# Patient Record
Sex: MALE | URBAN - METROPOLITAN AREA
[De-identification: names, ages, dates, MRNs, and addresses within clinical notes are randomized per-mention and may not be internally consistent; named-entity substitution may affect disease eponyms.]

---

## 2023-01-31 ENCOUNTER — OFFICE (OUTPATIENT)
Dept: URBAN - METROPOLITAN AREA CLINIC 63 | Facility: CLINIC | Age: 88
Setting detail: OPHTHALMOLOGY
End: 2023-01-31
Payer: MEDICARE

## 2023-01-31 DIAGNOSIS — H35.033: ICD-10-CM

## 2023-01-31 DIAGNOSIS — H40.1133: ICD-10-CM

## 2023-01-31 DIAGNOSIS — H25.13: ICD-10-CM

## 2023-01-31 PROCEDURE — 92250 FUNDUS PHOTOGRAPHY W/I&R: CPT | Performed by: OPHTHALMOLOGY

## 2023-01-31 PROCEDURE — 92014 COMPRE OPH EXAM EST PT 1/>: CPT | Performed by: OPHTHALMOLOGY

## 2023-01-31 ASSESSMENT — PACHYMETRY
OD_CT_CORRECTION: -4
OD_CT_UM: 602
OS_CT_CORRECTION: -3
OS_CT_UM: 586

## 2023-01-31 ASSESSMENT — TONOMETRY
OD_IOP_MMHG: 14
OS_IOP_MMHG: 14

## 2023-01-31 ASSESSMENT — CONFRONTATIONAL VISUAL FIELD TEST (CVF)
OS_FINDINGS: FULL
OD_FINDINGS: FULL

## 2023-01-31 ASSESSMENT — REFRACTION_AUTOREFRACTION
OS_SPHERE: UTP
OD_SPHERE: UTP

## 2023-05-30 ENCOUNTER — OFFICE (OUTPATIENT)
Dept: URBAN - METROPOLITAN AREA CLINIC 63 | Facility: CLINIC | Age: 88
Setting detail: OPHTHALMOLOGY
End: 2023-05-30
Payer: MEDICARE

## 2023-05-30 DIAGNOSIS — H40.1133: ICD-10-CM

## 2023-05-30 DIAGNOSIS — H35.033: ICD-10-CM

## 2023-05-30 DIAGNOSIS — H25.13: ICD-10-CM

## 2023-05-30 PROCEDURE — 92133 CPTRZD OPH DX IMG PST SGM ON: CPT | Performed by: OPHTHALMOLOGY

## 2023-05-30 PROCEDURE — 92020 GONIOSCOPY: CPT | Performed by: OPHTHALMOLOGY

## 2023-05-30 PROCEDURE — 92012 INTRM OPH EXAM EST PATIENT: CPT | Performed by: OPHTHALMOLOGY

## 2023-05-30 ASSESSMENT — TONOMETRY
OD_IOP_MMHG: 12
OS_IOP_MMHG: 12

## 2023-05-30 ASSESSMENT — VISUAL ACUITY
OS_BCVA: 20/20-1
OD_BCVA: HM

## 2023-05-30 ASSESSMENT — PACHYMETRY
OS_CT_CORRECTION: -3
OD_CT_UM: 602
OS_CT_UM: 586
OD_CT_CORRECTION: -4

## 2023-05-30 ASSESSMENT — SPHEQUIV_DERIVED
OS_SPHEQUIV: -8.75
OD_SPHEQUIV: -21.5

## 2023-05-30 ASSESSMENT — KERATOMETRY
OS_AXISANGLE_DEGREES: 093
OS_K1POWER_DIOPTERS: 40.00
METHOD_AUTO_MANUAL: AUTO
OS_K2POWER_DIOPTERS: 41.50
OD_K1POWER_DIOPTERS: 41.50
OD_K2POWER_DIOPTERS: 43.25
OD_AXISANGLE_DEGREES: 003

## 2023-05-30 ASSESSMENT — REFRACTION_AUTOREFRACTION
OS_SPHERE: -7.00
OD_SPHERE: -21.00
OD_AXIS: 130
OS_AXIS: 059
OS_CYLINDER: -3.50
OD_CYLINDER: -1.00

## 2023-05-30 ASSESSMENT — CONFRONTATIONAL VISUAL FIELD TEST (CVF)
OS_FINDINGS: FULL
OD_FINDINGS: FULL

## 2023-05-30 ASSESSMENT — AXIALLENGTH_DERIVED
OS_AL: 29.03
OD_AL: 37.6

## 2023-10-24 ENCOUNTER — OFFICE (OUTPATIENT)
Dept: URBAN - METROPOLITAN AREA CLINIC 63 | Facility: CLINIC | Age: 88
Setting detail: OPHTHALMOLOGY
End: 2023-10-24
Payer: MEDICARE

## 2023-10-24 DIAGNOSIS — H25.13: ICD-10-CM

## 2023-10-24 DIAGNOSIS — H40.1133: ICD-10-CM

## 2023-10-24 DIAGNOSIS — H35.033: ICD-10-CM

## 2023-10-24 PROCEDURE — 92014 COMPRE OPH EXAM EST PT 1/>: CPT | Performed by: OPHTHALMOLOGY

## 2023-10-24 PROCEDURE — 92083 EXTENDED VISUAL FIELD XM: CPT | Performed by: OPHTHALMOLOGY

## 2023-10-24 ASSESSMENT — REFRACTION_AUTOREFRACTION
OS_SPHERE: -7.00
OS_CYLINDER: -3.50
OD_CYLINDER: -1.00
OD_AXIS: 130
OS_AXIS: 059
OD_SPHERE: -21.00

## 2023-10-24 ASSESSMENT — KERATOMETRY
OD_K2POWER_DIOPTERS: 43.25
OS_K2POWER_DIOPTERS: 41.50
METHOD_AUTO_MANUAL: AUTO
OD_AXISANGLE_DEGREES: 003
OS_K1POWER_DIOPTERS: 40.00
OS_AXISANGLE_DEGREES: 093
OD_K1POWER_DIOPTERS: 41.50

## 2023-10-24 ASSESSMENT — PACHYMETRY
OD_CT_CORRECTION: -4
OS_CT_UM: 586
OS_CT_CORRECTION: -3
OD_CT_UM: 602

## 2023-10-24 ASSESSMENT — AXIALLENGTH_DERIVED
OD_AL: 37.6
OS_AL: 29.03

## 2023-10-24 ASSESSMENT — SPHEQUIV_DERIVED
OD_SPHEQUIV: -21.5
OS_SPHEQUIV: -8.75

## 2023-10-24 ASSESSMENT — VISUAL ACUITY
OD_BCVA: HM
OS_BCVA: 20/25

## 2023-10-24 ASSESSMENT — CONFRONTATIONAL VISUAL FIELD TEST (CVF)
OS_FINDINGS: FULL
OD_FINDINGS: FULL

## 2023-10-24 ASSESSMENT — TONOMETRY
OD_IOP_MMHG: 13
OS_IOP_MMHG: 13

## 2024-02-27 ENCOUNTER — OFFICE (OUTPATIENT)
Dept: URBAN - METROPOLITAN AREA CLINIC 63 | Facility: CLINIC | Age: 89
Setting detail: OPHTHALMOLOGY
End: 2024-02-27
Payer: MEDICARE

## 2024-02-27 DIAGNOSIS — H40.1133: ICD-10-CM

## 2024-02-27 DIAGNOSIS — H25.13: ICD-10-CM

## 2024-02-27 DIAGNOSIS — H35.033: ICD-10-CM

## 2024-02-27 PROCEDURE — 92250 FUNDUS PHOTOGRAPHY W/I&R: CPT | Performed by: OPHTHALMOLOGY

## 2024-02-27 PROCEDURE — 92014 COMPRE OPH EXAM EST PT 1/>: CPT | Performed by: OPHTHALMOLOGY

## 2024-02-27 ASSESSMENT — REFRACTION_AUTOREFRACTION
OD_AXIS: 130
OS_CYLINDER: -3.50
OD_CYLINDER: -1.00
OD_SPHERE: -21.00
OS_AXIS: 059
OS_SPHERE: -7.00

## 2024-02-27 ASSESSMENT — CONFRONTATIONAL VISUAL FIELD TEST (CVF)
OD_FINDINGS: FULL
OS_FINDINGS: FULL

## 2024-02-27 ASSESSMENT — SPHEQUIV_DERIVED
OS_SPHEQUIV: -8.75
OD_SPHEQUIV: -21.5

## 2024-03-26 ENCOUNTER — OFFICE (OUTPATIENT)
Dept: URBAN - METROPOLITAN AREA CLINIC 63 | Facility: CLINIC | Age: 89
Setting detail: OPHTHALMOLOGY
End: 2024-03-26
Payer: MEDICARE

## 2024-03-26 DIAGNOSIS — H35.033: ICD-10-CM

## 2024-03-26 DIAGNOSIS — H25.13: ICD-10-CM

## 2024-03-26 DIAGNOSIS — H25.11: ICD-10-CM

## 2024-03-26 DIAGNOSIS — H40.1133: ICD-10-CM

## 2024-03-26 PROCEDURE — 99214 OFFICE O/P EST MOD 30 MIN: CPT | Performed by: OPHTHALMOLOGY

## 2024-03-26 PROCEDURE — 92136 OPHTHALMIC BIOMETRY: CPT | Mod: RT | Performed by: OPHTHALMOLOGY

## 2024-03-26 PROCEDURE — 92136 OPHTHALMIC BIOMETRY: CPT | Mod: TC | Performed by: OPHTHALMOLOGY

## 2024-03-26 ASSESSMENT — REFRACTION_CURRENTRX
OD_CYLINDER: -0.75
OS_VPRISM_DIRECTION: SV
OS_OVR_VA: 20/
OS_SPHERE: +3.00
OD_OVR_VA: 20/
OD_VPRISM_DIRECTION: SV
OD_SPHERE: +3.25
OS_CYLINDER: -1.00
OS_AXIS: 102
OD_AXIS: 108

## 2024-03-26 ASSESSMENT — REFRACTION_MANIFEST
OD_SPHERE: +3.25
OD_VA1: 20/25
OD_AXIS: 108
OD_CYLINDER: -0.75

## 2024-03-26 ASSESSMENT — SPHEQUIV_DERIVED: OD_SPHEQUIV: 2.875

## 2024-03-29 PROBLEM — H25.12 CATARACT; RIGHT EYE, LEFT EYE, BOTH EYES: Status: ACTIVE | Noted: 2024-03-26

## 2024-03-29 PROBLEM — H25.13 CATARACT; RIGHT EYE, LEFT EYE, BOTH EYES: Status: ACTIVE | Noted: 2024-03-26

## 2024-03-29 PROBLEM — H25.11 CATARACT; RIGHT EYE, LEFT EYE, BOTH EYES: Status: ACTIVE | Noted: 2024-03-26

## 2024-04-17 ENCOUNTER — ASC (OUTPATIENT)
Dept: URBAN - METROPOLITAN AREA SURGERY 8 | Facility: SURGERY | Age: 89
Setting detail: OPHTHALMOLOGY
End: 2024-04-17
Payer: MEDICARE

## 2024-04-17 DIAGNOSIS — H25.11: ICD-10-CM

## 2024-04-17 DIAGNOSIS — H52.211: ICD-10-CM

## 2024-04-17 DIAGNOSIS — H40.1113: ICD-10-CM

## 2024-04-17 PROCEDURE — 66991 XCAPSL CTRC RMVL INSJ 1+: CPT | Mod: RT | Performed by: OPHTHALMOLOGY

## 2024-04-17 PROCEDURE — FEMTO FEMTOSECOND LASER: Mod: GY | Performed by: OPHTHALMOLOGY

## 2024-04-17 PROCEDURE — 65820 GONIOTOMY: CPT | Mod: RT | Performed by: OPHTHALMOLOGY

## 2024-04-18 ENCOUNTER — OFFICE (OUTPATIENT)
Dept: URBAN - METROPOLITAN AREA CLINIC 112 | Facility: CLINIC | Age: 89
Setting detail: OPHTHALMOLOGY
End: 2024-04-18
Payer: MEDICARE

## 2024-04-18 ENCOUNTER — RX ONLY (RX ONLY)
Age: 89
End: 2024-04-18

## 2024-04-18 DIAGNOSIS — Z96.1: ICD-10-CM

## 2024-04-18 DIAGNOSIS — H40.1133: ICD-10-CM

## 2024-04-18 PROCEDURE — 99024 POSTOP FOLLOW-UP VISIT: CPT | Performed by: PHYSICIAN ASSISTANT

## 2024-04-24 ENCOUNTER — OFFICE (OUTPATIENT)
Dept: URBAN - METROPOLITAN AREA CLINIC 94 | Facility: CLINIC | Age: 89
Setting detail: OPHTHALMOLOGY
End: 2024-04-24
Payer: MEDICARE

## 2024-04-24 DIAGNOSIS — H26.491: ICD-10-CM

## 2024-04-24 DIAGNOSIS — H40.1133: ICD-10-CM

## 2024-04-24 DIAGNOSIS — Z96.1: ICD-10-CM

## 2024-04-24 PROBLEM — H25.12 CATARACT SENILE NUCLEAR SCLEROSIS; LEFT EYE: Status: ACTIVE | Noted: 2024-04-18

## 2024-04-24 PROCEDURE — 99024 POSTOP FOLLOW-UP VISIT: CPT | Performed by: PHYSICIAN ASSISTANT

## 2024-05-28 ENCOUNTER — OFFICE (OUTPATIENT)
Dept: URBAN - METROPOLITAN AREA CLINIC 63 | Facility: CLINIC | Age: 89
Setting detail: OPHTHALMOLOGY
End: 2024-05-28
Payer: MEDICARE

## 2024-05-28 DIAGNOSIS — Z96.1: ICD-10-CM

## 2024-05-28 DIAGNOSIS — H40.1133: ICD-10-CM

## 2024-05-28 DIAGNOSIS — H26.491: ICD-10-CM

## 2024-05-28 PROCEDURE — 99024 POSTOP FOLLOW-UP VISIT: CPT | Performed by: OPHTHALMOLOGY

## 2024-05-28 ASSESSMENT — CONFRONTATIONAL VISUAL FIELD TEST (CVF)
OD_FINDINGS: FULL
OS_FINDINGS: FULL

## 2024-07-15 ENCOUNTER — RX ONLY (RX ONLY)
Age: 89
End: 2024-07-15

## 2024-07-15 ENCOUNTER — OFFICE (OUTPATIENT)
Dept: URBAN - METROPOLITAN AREA CLINIC 63 | Facility: CLINIC | Age: 89
Setting detail: OPHTHALMOLOGY
End: 2024-07-15
Payer: MEDICARE

## 2024-07-15 DIAGNOSIS — H35.033: ICD-10-CM

## 2024-07-15 DIAGNOSIS — H35.373: ICD-10-CM

## 2024-07-15 PROCEDURE — 92235 FLUORESCEIN ANGRPH MLTIFRAME: CPT | Performed by: OPHTHALMOLOGY

## 2024-07-15 PROCEDURE — 99213 OFFICE O/P EST LOW 20 MIN: CPT | Performed by: OPHTHALMOLOGY

## 2024-07-15 PROCEDURE — 92134 CPTRZ OPH DX IMG PST SGM RTA: CPT | Performed by: OPHTHALMOLOGY

## 2024-07-15 ASSESSMENT — CONFRONTATIONAL VISUAL FIELD TEST (CVF)
OD_FINDINGS: FULL
OS_FINDINGS: FULL

## 2024-08-27 ENCOUNTER — OFFICE (OUTPATIENT)
Dept: URBAN - METROPOLITAN AREA CLINIC 63 | Facility: CLINIC | Age: 89
Setting detail: OPHTHALMOLOGY
End: 2024-08-27
Payer: MEDICARE

## 2024-08-27 DIAGNOSIS — H40.1133: ICD-10-CM

## 2024-08-27 DIAGNOSIS — Z96.1: ICD-10-CM

## 2024-08-27 DIAGNOSIS — H26.491: ICD-10-CM

## 2024-08-27 PROCEDURE — 92014 COMPRE OPH EXAM EST PT 1/>: CPT | Performed by: OPHTHALMOLOGY

## 2024-08-27 PROCEDURE — 92133 CPTRZD OPH DX IMG PST SGM ON: CPT | Performed by: OPHTHALMOLOGY

## 2024-08-27 ASSESSMENT — CONFRONTATIONAL VISUAL FIELD TEST (CVF)
OD_FINDINGS: FULL
OS_FINDINGS: FULL

## 2024-09-13 ENCOUNTER — ASC (OUTPATIENT)
Dept: URBAN - METROPOLITAN AREA SURGERY 8 | Facility: SURGERY | Age: 89
Setting detail: OPHTHALMOLOGY
End: 2024-09-13
Payer: MEDICARE

## 2024-09-13 DIAGNOSIS — H26.491: ICD-10-CM

## 2024-09-13 PROCEDURE — 66821 AFTER CATARACT LASER SURGERY: CPT | Mod: RT | Performed by: OPHTHALMOLOGY

## 2024-09-16 ENCOUNTER — OFFICE (OUTPATIENT)
Dept: URBAN - METROPOLITAN AREA CLINIC 63 | Facility: CLINIC | Age: 89
Setting detail: OPHTHALMOLOGY
End: 2024-09-16
Payer: MEDICARE

## 2024-09-16 DIAGNOSIS — H35.033: ICD-10-CM

## 2024-09-16 DIAGNOSIS — H35.373: ICD-10-CM

## 2024-09-16 PROCEDURE — 99213 OFFICE O/P EST LOW 20 MIN: CPT | Mod: 24 | Performed by: OPHTHALMOLOGY

## 2024-09-16 PROCEDURE — 92134 CPTRZ OPH DX IMG PST SGM RTA: CPT | Performed by: OPHTHALMOLOGY

## 2024-09-16 ASSESSMENT — CONFRONTATIONAL VISUAL FIELD TEST (CVF)
OS_FINDINGS: FULL
OD_FINDINGS: FULL

## 2024-10-08 ENCOUNTER — OFFICE (OUTPATIENT)
Dept: URBAN - METROPOLITAN AREA CLINIC 63 | Facility: CLINIC | Age: 89
Setting detail: OPHTHALMOLOGY
End: 2024-10-08
Payer: MEDICARE

## 2024-10-08 DIAGNOSIS — H40.1133: ICD-10-CM

## 2024-10-08 DIAGNOSIS — H26.491: ICD-10-CM

## 2024-10-08 DIAGNOSIS — Z96.1: ICD-10-CM

## 2024-10-08 PROCEDURE — 99024 POSTOP FOLLOW-UP VISIT: CPT | Performed by: OPHTHALMOLOGY

## 2024-10-08 ASSESSMENT — KERATOMETRY
OS_K1POWER_DIOPTERS: 41.50
METHOD_AUTO_MANUAL: AUTO
OS_K2POWER_DIOPTERS: 43.75
OS_AXISANGLE_DEGREES: 004
OD_K2POWER_DIOPTERS: 42.75
OD_AXISANGLE_DEGREES: 048
OD_K1POWER_DIOPTERS: 40.75

## 2024-10-08 ASSESSMENT — VISUAL ACUITY
OD_BCVA: HM
OS_BCVA: 20/40

## 2024-10-08 ASSESSMENT — TONOMETRY
OS_IOP_MMHG: 11
OD_IOP_MMHG: 11

## 2024-10-08 ASSESSMENT — REFRACTION_AUTOREFRACTION
OD_SPHERE: 0.00
OD_CYLINDER: -2.00
OD_AXIS: 139
OS_SPHERE: UTP

## 2024-10-08 ASSESSMENT — PACHYMETRY
OS_CT_UM: 586
OS_CT_CORRECTION: -3
OD_CT_UM: 602
OD_CT_CORRECTION: -4

## 2024-10-08 ASSESSMENT — CONFRONTATIONAL VISUAL FIELD TEST (CVF)
OS_FINDINGS: FULL
OD_FINDINGS: FULL

## 2024-11-18 ENCOUNTER — OFFICE (OUTPATIENT)
Dept: URBAN - METROPOLITAN AREA CLINIC 63 | Facility: CLINIC | Age: 89
Setting detail: OPHTHALMOLOGY
End: 2024-11-18
Payer: MEDICARE

## 2024-11-18 ENCOUNTER — RX ONLY (RX ONLY)
Age: 89
End: 2024-11-18

## 2024-11-18 DIAGNOSIS — H35.033: ICD-10-CM

## 2024-11-18 DIAGNOSIS — H35.373: ICD-10-CM

## 2024-11-18 PROCEDURE — 92012 INTRM OPH EXAM EST PATIENT: CPT | Mod: 24 | Performed by: OPHTHALMOLOGY

## 2024-11-18 PROCEDURE — 92134 CPTRZ OPH DX IMG PST SGM RTA: CPT | Performed by: OPHTHALMOLOGY

## 2024-11-18 ASSESSMENT — TONOMETRY: OD_IOP_MMHG: 16

## 2024-11-18 ASSESSMENT — PACHYMETRY
OD_CT_CORRECTION: -4
OS_CT_CORRECTION: -3
OD_CT_UM: 602
OS_CT_UM: 586

## 2024-11-18 ASSESSMENT — REFRACTION_AUTOREFRACTION
OD_AXIS: 139
OS_SPHERE: UTP
OD_SPHERE: 0.00
OD_CYLINDER: -2.00

## 2024-11-18 ASSESSMENT — KERATOMETRY
OS_K1POWER_DIOPTERS: 41.50
OD_AXISANGLE_DEGREES: 048
OS_AXISANGLE_DEGREES: 004
METHOD_AUTO_MANUAL: AUTO
OS_K2POWER_DIOPTERS: 43.75
OD_K2POWER_DIOPTERS: 42.75
OD_K1POWER_DIOPTERS: 40.75

## 2024-11-18 ASSESSMENT — VISUAL ACUITY
OD_BCVA: HM
OS_BCVA: 20/40-

## 2024-12-30 ENCOUNTER — EMERGENCY (EMERGENCY)
Facility: HOSPITAL | Age: 88
LOS: 1 days | Discharge: HOME | End: 2024-12-30
Attending: EMERGENCY MEDICINE
Payer: MEDICARE

## 2024-12-30 VITALS
DIASTOLIC BLOOD PRESSURE: 88 MMHG | HEART RATE: 69 BPM | TEMPERATURE: 98 F | RESPIRATION RATE: 20 BRPM | OXYGEN SATURATION: 96 % | SYSTOLIC BLOOD PRESSURE: 128 MMHG

## 2024-12-30 VITALS
RESPIRATION RATE: 20 BRPM | HEART RATE: 96 BPM | DIASTOLIC BLOOD PRESSURE: 74 MMHG | WEIGHT: 166.01 LBS | OXYGEN SATURATION: 93 % | TEMPERATURE: 98 F | SYSTOLIC BLOOD PRESSURE: 132 MMHG

## 2024-12-30 LAB
ALBUMIN SERPL ELPH-MCNC: 4 G/DL — SIGNIFICANT CHANGE UP (ref 3.3–5.2)
ALP SERPL-CCNC: 77 U/L — SIGNIFICANT CHANGE UP (ref 40–120)
ALT FLD-CCNC: 18 U/L — SIGNIFICANT CHANGE UP
ANION GAP SERPL CALC-SCNC: 15 MMOL/L — SIGNIFICANT CHANGE UP (ref 5–17)
AST SERPL-CCNC: 24 U/L — SIGNIFICANT CHANGE UP
BASOPHILS # BLD AUTO: 0.02 K/UL — SIGNIFICANT CHANGE UP (ref 0–0.2)
BASOPHILS NFR BLD AUTO: 0.3 % — SIGNIFICANT CHANGE UP (ref 0–2)
BILIRUB SERPL-MCNC: 0.7 MG/DL — SIGNIFICANT CHANGE UP (ref 0.4–2)
BUN SERPL-MCNC: 19.7 MG/DL — SIGNIFICANT CHANGE UP (ref 8–20)
CALCIUM SERPL-MCNC: 10 MG/DL — SIGNIFICANT CHANGE UP (ref 8.4–10.5)
CHLORIDE SERPL-SCNC: 101 MMOL/L — SIGNIFICANT CHANGE UP (ref 96–108)
CO2 SERPL-SCNC: 21 MMOL/L — LOW (ref 22–29)
CREAT SERPL-MCNC: 1.15 MG/DL — SIGNIFICANT CHANGE UP (ref 0.5–1.3)
EGFR: 60 ML/MIN/1.73M2 — SIGNIFICANT CHANGE UP
EOSINOPHIL # BLD AUTO: 0 K/UL — SIGNIFICANT CHANGE UP (ref 0–0.5)
EOSINOPHIL NFR BLD AUTO: 0 % — SIGNIFICANT CHANGE UP (ref 0–6)
GLUCOSE SERPL-MCNC: 173 MG/DL — HIGH (ref 70–99)
HCT VFR BLD CALC: 43.2 % — SIGNIFICANT CHANGE UP (ref 39–50)
HGB BLD-MCNC: 14.7 G/DL — SIGNIFICANT CHANGE UP (ref 13–17)
IMM GRANULOCYTES NFR BLD AUTO: 0.1 % — SIGNIFICANT CHANGE UP (ref 0–0.9)
LYMPHOCYTES # BLD AUTO: 1.48 K/UL — SIGNIFICANT CHANGE UP (ref 1–3.3)
LYMPHOCYTES # BLD AUTO: 21.5 % — SIGNIFICANT CHANGE UP (ref 13–44)
MCHC RBC-ENTMCNC: 28.3 PG — SIGNIFICANT CHANGE UP (ref 27–34)
MCHC RBC-ENTMCNC: 34 G/DL — SIGNIFICANT CHANGE UP (ref 32–36)
MCV RBC AUTO: 83.1 FL — SIGNIFICANT CHANGE UP (ref 80–100)
MONOCYTES # BLD AUTO: 0.87 K/UL — SIGNIFICANT CHANGE UP (ref 0–0.9)
MONOCYTES NFR BLD AUTO: 12.7 % — SIGNIFICANT CHANGE UP (ref 2–14)
NEUTROPHILS # BLD AUTO: 4.49 K/UL — SIGNIFICANT CHANGE UP (ref 1.8–7.4)
NEUTROPHILS NFR BLD AUTO: 65.4 % — SIGNIFICANT CHANGE UP (ref 43–77)
PLATELET # BLD AUTO: 137 K/UL — LOW (ref 150–400)
POTASSIUM SERPL-MCNC: 3 MMOL/L — LOW (ref 3.5–5.3)
POTASSIUM SERPL-SCNC: 3 MMOL/L — LOW (ref 3.5–5.3)
PROT SERPL-MCNC: 7.1 G/DL — SIGNIFICANT CHANGE UP (ref 6.6–8.7)
RBC # BLD: 5.2 M/UL — SIGNIFICANT CHANGE UP (ref 4.2–5.8)
RBC # FLD: 14.2 % — SIGNIFICANT CHANGE UP (ref 10.3–14.5)
SODIUM SERPL-SCNC: 137 MMOL/L — SIGNIFICANT CHANGE UP (ref 135–145)
WBC # BLD: 6.87 K/UL — SIGNIFICANT CHANGE UP (ref 3.8–10.5)
WBC # FLD AUTO: 6.87 K/UL — SIGNIFICANT CHANGE UP (ref 3.8–10.5)

## 2024-12-30 PROCEDURE — 71046 X-RAY EXAM CHEST 2 VIEWS: CPT

## 2024-12-30 PROCEDURE — 82962 GLUCOSE BLOOD TEST: CPT

## 2024-12-30 PROCEDURE — 71046 X-RAY EXAM CHEST 2 VIEWS: CPT | Mod: 26

## 2024-12-30 PROCEDURE — 80053 COMPREHEN METABOLIC PANEL: CPT

## 2024-12-30 PROCEDURE — 85025 COMPLETE CBC W/AUTO DIFF WBC: CPT

## 2024-12-30 PROCEDURE — 36415 COLL VENOUS BLD VENIPUNCTURE: CPT

## 2024-12-30 PROCEDURE — 99284 EMERGENCY DEPT VISIT MOD MDM: CPT | Mod: 25

## 2024-12-30 PROCEDURE — 71250 CT THORAX DX C-: CPT | Mod: 26

## 2024-12-30 PROCEDURE — 99284 EMERGENCY DEPT VISIT MOD MDM: CPT

## 2024-12-30 PROCEDURE — 71250 CT THORAX DX C-: CPT | Mod: MC

## 2024-12-30 RX ORDER — POTASSIUM CHLORIDE 600 MG/1
40 TABLET, FILM COATED, EXTENDED RELEASE ORAL ONCE
Refills: 0 | Status: COMPLETED | OUTPATIENT
Start: 2024-12-30 | End: 2024-12-30

## 2024-12-30 RX ORDER — OSELTAMIVIR 75 MG/1
1 CAPSULE ORAL
Qty: 10 | Refills: 0
Start: 2024-12-30 | End: 2025-01-03

## 2024-12-30 RX ORDER — OSELTAMIVIR 75 MG/1
75 CAPSULE ORAL ONCE
Refills: 0 | Status: COMPLETED | OUTPATIENT
Start: 2024-12-30 | End: 2024-12-30

## 2024-12-30 RX ADMIN — OSELTAMIVIR 75 MILLIGRAM(S): 75 CAPSULE ORAL at 16:44

## 2024-12-30 RX ADMIN — POTASSIUM CHLORIDE 40 MILLIEQUIVALENT(S): 600 TABLET, FILM COATED, EXTENDED RELEASE ORAL at 18:34

## 2024-12-30 NOTE — ED PROVIDER NOTE - PHYSICAL EXAMINATION
Gen: well appearing, no acute distress  Head: normocephalic, atraumatic  EENT: EOMI, moist mucous membranes, no scleral icterus, no JVD  Lung: no increased work of breathing, clear to auscultation bilaterally, no wheezing, rales, rhonchi, speaking in full sentences  CV: regular rate, regular rhythm, normal s1/s2, 2+ radial pulses bilaterally  Abd: soft, non-tender, non-distended,  no CVA tenderness  MSK: No edema, no visible deformities, full range of motion in all 4 extremities  Neuro: Awake, alert, no focal neurologic deficits  Skin: No obvious rash, no jaundice  Psych: normal affect, normal speech

## 2024-12-30 NOTE — ED ADULT NURSE NOTE - OBJECTIVE STATEMENT
A&Ox3, family at bedside, PT presents to ed from urgent care for ams. + flu. Urine negative at urgent care. C/O unsteady gait and ams x2 days. Breathing is spontaneous even and unlabored. Denies pain, cough, congestion, SOB. Bed is in lowest position and side rails up. Safety precautions maintained.

## 2024-12-30 NOTE — ED ADULT TRIAGE NOTE - CHIEF COMPLAINT QUOTE
PT presents to ed from urgent care for ams. + flu. Urine negative at urgent care. C/O unsteady gait and ams x2 days

## 2024-12-30 NOTE — ED PROVIDER NOTE - OBJECTIVE STATEMENT
89yo M PMHx BPH, HTN presents to the ED for URI symptoms. Patient speaks [place] and uses his wife as , Wife states that last night the patient woke up screaming and was found to have urinary incontinence.  Patient went to urgent care and was found to be positive for influenza A, patient complains of dyspnea intermittently, some dysuria, and weakness, but no fever, no cough. no sore throat.

## 2024-12-30 NOTE — ED PROVIDER NOTE - PATIENT PORTAL LINK FT
You can access the FollowMyHealth Patient Portal offered by [facility] by registering at the following website: [email or web address]. By joining [facility]’s FollowMyHealth portal, you will also be able to view your health information using other applications (apps) compatible with our system.

## 2024-12-30 NOTE — ED ADULT NURSE NOTE - CCCP TRG CHIEF CMPLNT
[facility] [address] [address]                                    After Visit Summary   12/29/2020    Ashli Gamboa    MRN: [id number]           After Visit Summary Signature Page    I have received my discharge instructions, and my questions have been answered. I have discussed any challenges I see with this plan with the nurse or doctor.    ..........................................................................................................................................  Patient/Patient Representative Signature      ..........................................................................................................................................  Patient Representative Print Name and Relationship to Patient    ..................................................               ................................................  Date                                   Time    ..........................................................................................................................................  Reviewed by Signature/Title    ...................................................              ..............................................  Date                                               Time          22EPIC Rev 08/18        altered mental status

## 2024-12-30 NOTE — ED PROVIDER NOTE - ATTENDING CONTRIBUTION TO CARE
89yo M PMHx BPH, HTN presents to the ED for URI symptoms.  pt tested positve for influenza a ; pe awake alert heent ncat neck supple cor s1s2 lung clear abd soft nontender neuro nonfocal dx flu; xray and reassess

## 2024-12-30 NOTE — ED PROVIDER NOTE - NSFOLLOWUPINSTRUCTIONS_ED_ALL_ED_FT
Influenza    AMBULATORY CARE:    Influenza (the flu) is a viral infection of the lungs and airways. The virus spreads through droplets in the air when someone with flu coughs or sneezes. You may also get the virus through close contact with someone who has the flu. For example, a person with the virus on his or her hands can spread it by shaking hands with someone. You may spread the flu to others for 1 week or longer after signs or symptoms appear.    Common signs and symptoms include the following:    Fever and chills    Headaches, body aches, and muscle or joint pain    Cough, runny nose, and sore throat    Loss of appetite, nausea, vomiting, or diarrhea    Tiredness    Trouble breathing  Call your local emergency number (911 in the [place]) if:    You have trouble breathing, and your lips look purple or blue.    You have a seizure.  Seek care immediately if:    You are dizzy, or you are urinating less or not at all.    You have a headache with a stiff neck, and you feel tired or confused.    You have new pain or pressure in your chest.    Your symptoms, such as shortness of breath, vomiting, or diarrhea, get worse.    Your symptoms, such as fever and coughing, seem to get better, but then get worse.  Call your doctor if:    You have new muscle pain or weakness.    You have questions or concerns about your condition or care.  Treatment for influenza may include any of the following:    Acetaminophen decreases pain and fever. It is available without a doctor's order. Ask how much to take and how often to take it. Follow directions. Read the labels of all other medicines you are using to see if they also contain acetaminophen, or ask your doctor or pharmacist. Acetaminophen can cause liver damage if not taken correctly.    NSAIDs, such as ibuprofen, help decrease swelling, pain, and fever. This medicine is available with or without a doctor's order. NSAIDs can cause stomach bleeding or kidney problems in certain people. If you take blood thinner medicine, always ask your healthcare provider if NSAIDs are safe for you. Always read the medicine label and follow directions.    Antivirals help treat viral infections.  Manage your symptoms:    Rest as much as you can to help you recover.    Drink liquids as directed to help prevent dehydration. Ask how much liquid to drink each day and which liquids are best for you.    Gargle with warm salt water to soothe your sore throat. Your healthcare provider may also recommend throat lozenges or sprays.    Use a cool mist humidifier or vaporizer to ease your breathing and unplug your nose.  Humidifier  Prevent the spread of germs:      Wash your hands often. Wash your hands several times each day. Wash after you use the bathroom, change a child's diaper, and before you prepare or eat food. Use soap and water every time. Rub your soapy hands together, lacing your fingers. Wash the front and back of your hands, and in between your fingers. Use the fingers of one hand to scrub under the fingernails of the other hand. Wash for at least 20 seconds. Rinse with warm, running water for several seconds. Then dry your hands with a clean towel or paper towel. Use hand  that contains alcohol if soap and water are not available. Do not touch your eyes, nose, or mouth without washing your hands first.  Handwashing      Cover a sneeze or cough. Use a tissue that covers your mouth and nose. Throw the tissue away in a trash can right away. Use the bend of your arm if a tissue is not available. Wash your hands well with soap and water or use hand .    Stay home if you are sick. Avoid crowds during the first 1 to 4 days of illness.    Ask about vaccines you may need. Talk to your healthcare provider about your vaccine history. Your provider can tell you which vaccines you need, and when to get them.  Get the flu vaccine as soon as recommended. The vaccine may be available starting in September or October. Flu viruses change, so it is important to get a flu vaccine every year.    Get a COVID-19 vaccine as recommended. Vaccination is recommended for everyone 6 months or older. Your provider can tell you when and how often to get booster doses.    Get the pneumonia vaccine if recommended. This vaccine is usually recommended every 5 years. Your provider will tell you when to get this vaccine, if needed.  Follow up with your doctor as directed: Write down your questions so you remember to ask them during your visits.

## 2024-12-30 NOTE — ED PROVIDER NOTE - CLINICAL SUMMARY MEDICAL DECISION MAKING FREE TEXT BOX
89yo M PMHx BPH, HTN presents to the ED for URI symptoms, positive for Influenza A and who is afebrile, hemodynamically stable and saturating well on RA w/ no signs, has clear lungs on exam. Ordered cbc, cmp, tamiflu 89yo M PMHx BPH, HTN presents to the ED for URI symptoms, positive for Influenza A and who is afebrile, hemodynamically stable and saturating well on RA w/ no signs of respiratory distress and has clear lungs on exam. Ordered cbc, cmp, tamiflu, cxr

## 2024-12-30 NOTE — ED ADULT NURSE NOTE - NSFALLRISKINTERV_ED_ALL_ED

## 2024-12-31 RX ORDER — OSELTAMIVIR 75 MG/1
1 CAPSULE ORAL
Qty: 10 | Refills: 0
Start: 2024-12-31 | End: 2025-01-04

## 2024-12-31 NOTE — CHART NOTE - NSCHARTNOTEFT_GEN_A_CORE
Patient called the emergency department and states that her prescription never made it to pharmacy.  I resubmitted prescription as per provider recommendations.

## 2025-01-14 ENCOUNTER — OFFICE (OUTPATIENT)
Dept: URBAN - METROPOLITAN AREA CLINIC 63 | Facility: CLINIC | Age: OVER 89
Setting detail: OPHTHALMOLOGY
End: 2025-01-14
Payer: MEDICARE

## 2025-01-14 DIAGNOSIS — H35.033: ICD-10-CM

## 2025-01-14 DIAGNOSIS — H40.1133: ICD-10-CM

## 2025-01-14 PROCEDURE — 92250 FUNDUS PHOTOGRAPHY W/I&R: CPT | Performed by: OPHTHALMOLOGY

## 2025-01-14 PROCEDURE — 92014 COMPRE OPH EXAM EST PT 1/>: CPT | Performed by: OPHTHALMOLOGY

## 2025-01-14 ASSESSMENT — CONFRONTATIONAL VISUAL FIELD TEST (CVF)
OD_FINDINGS: FULL
OS_FINDINGS: FULL

## 2025-01-14 ASSESSMENT — REFRACTION_CURRENTRX
OS_ADD: +3.00
OD_OVR_VA: 20/
OD_VPRISM_DIRECTION: PROGS
OS_SPHERE: +0.50
OS_VPRISM_DIRECTION: PROGS
OD_ADD: +3.00
OD_SPHERE: +0.75
OS_AXIS: 149
OS_CYLINDER: -2.00
OS_OVR_VA: 20/
OD_CYLINDER: -2.00
OD_AXIS: 143

## 2025-01-14 ASSESSMENT — VISUAL ACUITY
OS_BCVA: 20/50-1
OD_BCVA: HM

## 2025-01-14 ASSESSMENT — REFRACTION_AUTOREFRACTION
OD_SPHERE: UTP
OS_SPHERE: UTP

## 2025-01-14 ASSESSMENT — KERATOMETRY
OS_K1POWER_DIOPTERS: 41.75
OS_AXISANGLE_DEGREES: 005
OS_K2POWER_DIOPTERS: 43.75
OD_K1POWER_DIOPTERS: 41.50
OD_AXISANGLE_DEGREES: 070
METHOD_AUTO_MANUAL: AUTO
OD_K2POWER_DIOPTERS: 43.75

## 2025-01-14 ASSESSMENT — PACHYMETRY
OD_CT_UM: 602
OS_CT_UM: 586
OD_CT_CORRECTION: -4
OS_CT_CORRECTION: -3

## 2025-01-14 ASSESSMENT — TONOMETRY
OS_IOP_MMHG: 12
OD_IOP_MMHG: 12

## 2025-05-27 ENCOUNTER — OFFICE (OUTPATIENT)
Dept: URBAN - METROPOLITAN AREA CLINIC 63 | Facility: CLINIC | Age: OVER 89
Setting detail: OPHTHALMOLOGY
End: 2025-05-27
Payer: MEDICARE

## 2025-05-27 DIAGNOSIS — H40.1133: ICD-10-CM

## 2025-05-27 DIAGNOSIS — H35.033: ICD-10-CM

## 2025-05-27 PROCEDURE — 99213 OFFICE O/P EST LOW 20 MIN: CPT | Performed by: OPHTHALMOLOGY

## 2025-05-27 ASSESSMENT — PACHYMETRY
OD_CT_CORRECTION: -4
OS_CT_CORRECTION: -3
OD_CT_UM: 602
OS_CT_UM: 586

## 2025-05-27 ASSESSMENT — REFRACTION_CURRENTRX
OD_CYLINDER: -2.00
OD_ADD: +3.00
OD_SPHERE: +0.75
OS_CYLINDER: -2.00
OS_AXIS: 149
OS_SPHERE: +0.50
OS_VPRISM_DIRECTION: PROGS
OS_ADD: +3.00
OD_OVR_VA: 20/
OS_OVR_VA: 20/
OD_VPRISM_DIRECTION: PROGS
OD_AXIS: 143

## 2025-05-27 ASSESSMENT — KERATOMETRY
OS_AXISANGLE_DEGREES: 005
OD_K1POWER_DIOPTERS: 41.50
OS_K2POWER_DIOPTERS: 43.75
OD_K2POWER_DIOPTERS: 43.75
OS_K1POWER_DIOPTERS: 41.75
OD_AXISANGLE_DEGREES: 070
METHOD_AUTO_MANUAL: AUTO

## 2025-05-27 ASSESSMENT — CONFRONTATIONAL VISUAL FIELD TEST (CVF)
OD_FINDINGS: FULL
OS_FINDINGS: FULL

## 2025-05-27 ASSESSMENT — REFRACTION_AUTOREFRACTION
OS_SPHERE: UTP
OD_SPHERE: UTP

## 2025-05-27 ASSESSMENT — TONOMETRY
OS_IOP_MMHG: 10
OD_IOP_MMHG: 12

## 2025-05-27 ASSESSMENT — VISUAL ACUITY
OS_BCVA: 20/50-1
OD_BCVA: HM